# Patient Record
Sex: FEMALE | Race: WHITE | URBAN - METROPOLITAN AREA
[De-identification: names, ages, dates, MRNs, and addresses within clinical notes are randomized per-mention and may not be internally consistent; named-entity substitution may affect disease eponyms.]

---

## 2019-03-27 ENCOUNTER — OFFICE VISIT (OUTPATIENT)
Dept: URBAN - METROPOLITAN AREA CLINIC 16 | Facility: CLINIC | Age: 79
End: 2019-03-27
Payer: MEDICARE

## 2019-03-27 DIAGNOSIS — H25.13 BILATERAL NUCLEAR SCLEROSIS CATARACT: Primary | ICD-10-CM

## 2019-03-27 PROCEDURE — 92004 COMPRE OPH EXAM NEW PT 1/>: CPT | Performed by: OPTOMETRIST

## 2019-03-27 ASSESSMENT — INTRAOCULAR PRESSURE
OD: 14
OS: 15

## 2019-03-27 ASSESSMENT — KERATOMETRY
OS: 45.13
OD: 44.88

## 2019-03-27 NOTE — IMPRESSION/PLAN
Impression: Bilateral nuclear sclerosis cataract: H25.13. Plan: Discussed diagnosis in detail with patient.

## 2019-04-05 ENCOUNTER — OFFICE VISIT (OUTPATIENT)
Dept: URBAN - METROPOLITAN AREA CLINIC 16 | Facility: CLINIC | Age: 79
End: 2019-04-05
Payer: MEDICARE

## 2019-04-05 PROCEDURE — 99203 OFFICE O/P NEW LOW 30 MIN: CPT | Performed by: OPHTHALMOLOGY

## 2019-04-05 PROCEDURE — 99214 OFFICE O/P EST MOD 30 MIN: CPT | Performed by: OPHTHALMOLOGY

## 2019-04-05 RX ORDER — DUREZOL 0.5 MG/ML
0.05 % EMULSION OPHTHALMIC
Qty: 5 | Refills: 0 | Status: INACTIVE
Start: 2019-04-05 | End: 2019-04-18

## 2019-04-05 RX ORDER — BESIFLOXACIN 6 MG/ML
0.6 % SUSPENSION OPHTHALMIC
Qty: 5 | Refills: 0 | Status: INACTIVE
Start: 2019-04-05 | End: 2019-04-13

## 2019-04-05 ASSESSMENT — KERATOMETRY
OS: 45.00
OD: 44.63

## 2019-04-05 ASSESSMENT — INTRAOCULAR PRESSURE
OD: 14
OS: 15

## 2019-04-05 ASSESSMENT — VISUAL ACUITY
OS: 20/40
OD: 20/50

## 2019-04-17 ENCOUNTER — PRE-OPERATIVE VISIT (OUTPATIENT)
Dept: URBAN - METROPOLITAN AREA CLINIC 16 | Facility: CLINIC | Age: 79
End: 2019-04-17
Payer: MEDICARE

## 2019-04-17 DIAGNOSIS — H25.813 COMBINED FORMS OF AGE-RELATED CATARACT, BILATERAL: Primary | ICD-10-CM

## 2019-04-17 PROCEDURE — 92136 OPHTHALMIC BIOMETRY: CPT | Performed by: OPHTHALMOLOGY

## 2019-04-17 ASSESSMENT — PACHYMETRY
OS: 3.01
OD: 23.54
OS: 23.46
OD: 3.02

## 2019-04-18 RX ORDER — OFLOXACIN 3 MG/ML
0.3 % SOLUTION/ DROPS OPHTHALMIC
Qty: 5 | Refills: 0 | Status: INACTIVE
Start: 2019-05-08 | End: 2019-05-10

## 2019-04-18 RX ORDER — PREDNISOLONE ACETATE 10 MG/ML
1 % SUSPENSION/ DROPS OPHTHALMIC
Qty: 10 | Refills: 0 | Status: INACTIVE
Start: 2019-04-18 | End: 2019-05-22

## 2019-04-18 RX ORDER — OFLOXACIN 3 MG/ML
0.3 % SOLUTION/ DROPS OPHTHALMIC
Qty: 5 | Refills: 0 | Status: INACTIVE
Start: 2019-04-18 | End: 2019-05-01

## 2019-04-18 RX ORDER — PREDNISOLONE ACETATE 10 MG/ML
1 % SUSPENSION/ DROPS OPHTHALMIC
Qty: 10 | Refills: 0 | Status: INACTIVE
Start: 2019-05-10 | End: 2019-06-06

## 2019-04-24 ENCOUNTER — SURGERY (OUTPATIENT)
Dept: URBAN - METROPOLITAN AREA SURGERY 11 | Facility: SURGERY | Age: 79
End: 2019-04-24
Payer: MEDICARE

## 2019-04-24 PROCEDURE — 66984 XCAPSL CTRC RMVL W/O ECP: CPT | Performed by: OPHTHALMOLOGY

## 2019-04-25 ENCOUNTER — POST-OPERATIVE VISIT (OUTPATIENT)
Dept: URBAN - METROPOLITAN AREA CLINIC 16 | Facility: CLINIC | Age: 79
End: 2019-04-25

## 2019-04-25 PROCEDURE — 99024 POSTOP FOLLOW-UP VISIT: CPT | Performed by: OPTOMETRIST

## 2019-04-25 ASSESSMENT — INTRAOCULAR PRESSURE
OD: 16
OS: 15

## 2019-05-03 ENCOUNTER — OFFICE VISIT (OUTPATIENT)
Dept: URBAN - METROPOLITAN AREA CLINIC 16 | Facility: CLINIC | Age: 79
End: 2019-05-03
Payer: MEDICARE

## 2019-05-03 DIAGNOSIS — H25.12 AGE-RELATED NUCLEAR CATARACT, LEFT EYE: Primary | ICD-10-CM

## 2019-05-03 PROCEDURE — 99213 OFFICE O/P EST LOW 20 MIN: CPT | Performed by: OPHTHALMOLOGY

## 2019-05-03 ASSESSMENT — INTRAOCULAR PRESSURE
OD: 10
OS: 17

## 2019-05-03 ASSESSMENT — VISUAL ACUITY
OD: 20/20
OS: 20/40

## 2019-05-03 NOTE — IMPRESSION/PLAN
Impression: Age-related nuclear cataract, left eye: H25.12. Sebas Mcguireor Visually significant, quality of life issue, could improve with surgery. Plan: Cataracts account for the patient's complaints. Discussed all risks, benefits, alternatives, procedures and recovery. Patient understands changing glasses will not improve vision. Patient desires to have surgery, recommend phacoemulsification with intraocular lens implant OS.   lvl 2 - pt declines premium IOL

## 2019-05-03 NOTE — IMPRESSION/PLAN
Impression: Presence of intraocular lens: Z96.1. OD. Condition: established, stable. Plan: pt confused on drops, will start them again. d/c abx, taper steroid per plan. d/c shield.

## 2019-05-09 ENCOUNTER — SURGERY (OUTPATIENT)
Dept: URBAN - METROPOLITAN AREA SURGERY 11 | Facility: SURGERY | Age: 79
End: 2019-05-09
Payer: MEDICARE

## 2019-05-09 PROCEDURE — 66984 XCAPSL CTRC RMVL W/O ECP: CPT | Performed by: OPHTHALMOLOGY

## 2019-05-10 ENCOUNTER — POST-OPERATIVE VISIT (OUTPATIENT)
Dept: URBAN - METROPOLITAN AREA CLINIC 23 | Facility: CLINIC | Age: 79
End: 2019-05-10

## 2019-05-10 DIAGNOSIS — Z09 ENCNTR FOR F/U EXAM AFT TRTMT FOR COND OTH THAN MALIG NEOPLM: Primary | ICD-10-CM

## 2019-05-10 PROCEDURE — 99024 POSTOP FOLLOW-UP VISIT: CPT | Performed by: OPTOMETRIST

## 2019-05-10 RX ORDER — OFLOXACIN 3 MG/ML
0.3 % SOLUTION/ DROPS OPHTHALMIC
Qty: 5 | Refills: 0 | Status: INACTIVE
Start: 2019-05-10 | End: 2019-06-04

## 2019-05-10 ASSESSMENT — INTRAOCULAR PRESSURE
OD: 11
OS: 10

## 2019-05-14 ENCOUNTER — POST-OPERATIVE VISIT (OUTPATIENT)
Dept: URBAN - METROPOLITAN AREA CLINIC 16 | Facility: CLINIC | Age: 79
End: 2019-05-14

## 2019-05-14 PROCEDURE — 99024 POSTOP FOLLOW-UP VISIT: CPT | Performed by: OPTOMETRIST

## 2019-05-14 ASSESSMENT — INTRAOCULAR PRESSURE
OS: 11
OD: 10

## 2019-06-04 ENCOUNTER — POST-OPERATIVE VISIT (OUTPATIENT)
Dept: URBAN - METROPOLITAN AREA CLINIC 16 | Facility: CLINIC | Age: 79
End: 2019-06-04

## 2019-06-04 PROCEDURE — 99024 POSTOP FOLLOW-UP VISIT: CPT | Performed by: OPTOMETRIST

## 2019-06-04 ASSESSMENT — INTRAOCULAR PRESSURE
OS: 11
OD: 11

## 2019-09-05 ENCOUNTER — OFFICE VISIT (OUTPATIENT)
Dept: URBAN - METROPOLITAN AREA CLINIC 16 | Facility: CLINIC | Age: 79
End: 2019-09-05
Payer: MEDICARE

## 2019-09-05 DIAGNOSIS — Z96.1 PRESENCE OF PSEUDOPHAKIA: Primary | ICD-10-CM

## 2019-09-05 DIAGNOSIS — H04.123 DRY EYE SYNDROME OF BILATERAL LACRIMAL GLANDS: ICD-10-CM

## 2019-09-05 PROCEDURE — 92014 COMPRE OPH EXAM EST PT 1/>: CPT | Performed by: OPTOMETRIST

## 2019-09-05 ASSESSMENT — INTRAOCULAR PRESSURE
OD: 12
OS: 10

## 2020-09-17 ENCOUNTER — OFFICE VISIT (OUTPATIENT)
Dept: URBAN - METROPOLITAN AREA CLINIC 16 | Facility: CLINIC | Age: 80
End: 2020-09-17
Payer: MEDICARE

## 2020-09-17 DIAGNOSIS — H10.45 OTHER CHRONIC ALLERGIC CONJUNCTIVITIS: ICD-10-CM

## 2020-09-17 PROCEDURE — 92014 COMPRE OPH EXAM EST PT 1/>: CPT | Performed by: OPTOMETRIST

## 2020-09-17 ASSESSMENT — INTRAOCULAR PRESSURE
OD: 14
OS: 14

## 2020-09-17 NOTE — IMPRESSION/PLAN
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 Plan: Patient is following with retinal specialist (Dr. Lety Carter) q6 months. Currently just observing.

## 2021-09-16 ENCOUNTER — OFFICE VISIT (OUTPATIENT)
Dept: URBAN - METROPOLITAN AREA CLINIC 16 | Facility: CLINIC | Age: 81
End: 2021-09-16
Payer: MEDICARE

## 2021-09-16 DIAGNOSIS — D31.30 BENIGN NEOPLASM OF CHOROID: ICD-10-CM

## 2021-09-16 PROCEDURE — 99213 OFFICE O/P EST LOW 20 MIN: CPT | Performed by: OPTOMETRIST

## 2021-09-16 ASSESSMENT — KERATOMETRY
OD: 44.75
OS: 45.25

## 2021-09-16 ASSESSMENT — INTRAOCULAR PRESSURE
OD: 14
OS: 14

## 2021-09-16 NOTE — IMPRESSION/PLAN
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 Plan:  Will continue to follow with Dr. Mynor Hebert

## 2022-09-12 ENCOUNTER — OFFICE VISIT (OUTPATIENT)
Dept: URBAN - METROPOLITAN AREA CLINIC 16 | Facility: CLINIC | Age: 82
End: 2022-09-12
Payer: MEDICARE

## 2022-09-12 DIAGNOSIS — D31.30 BENIGN NEOPLASM OF CHOROID: ICD-10-CM

## 2022-09-12 DIAGNOSIS — Z96.1 PRESENCE OF PSEUDOPHAKIA: Primary | ICD-10-CM

## 2022-09-12 PROCEDURE — 99213 OFFICE O/P EST LOW 20 MIN: CPT | Performed by: OPTOMETRIST

## 2022-09-12 ASSESSMENT — INTRAOCULAR PRESSURE
OS: 16
OD: 16

## 2023-09-25 ENCOUNTER — OFFICE VISIT (OUTPATIENT)
Dept: URBAN - METROPOLITAN AREA CLINIC 16 | Facility: CLINIC | Age: 83
End: 2023-09-25
Payer: MEDICARE

## 2023-09-25 DIAGNOSIS — Z96.1 PRESENCE OF PSEUDOPHAKIA: Primary | ICD-10-CM

## 2023-09-25 DIAGNOSIS — D31.30 BENIGN NEOPLASM OF CHOROID: ICD-10-CM

## 2023-09-25 PROCEDURE — 99213 OFFICE O/P EST LOW 20 MIN: CPT | Performed by: OPTOMETRIST

## 2023-09-25 ASSESSMENT — INTRAOCULAR PRESSURE
OD: 14
OS: 13

## 2024-09-24 ENCOUNTER — OFFICE VISIT (OUTPATIENT)
Dept: URBAN - METROPOLITAN AREA CLINIC 16 | Facility: CLINIC | Age: 84
End: 2024-09-24
Payer: MEDICARE

## 2024-09-24 DIAGNOSIS — Z96.1 PRESENCE OF PSEUDOPHAKIA: Primary | ICD-10-CM

## 2024-09-24 DIAGNOSIS — H04.123 DRY EYE SYNDROME OF BILATERAL LACRIMAL GLANDS: ICD-10-CM

## 2024-09-24 DIAGNOSIS — D31.30 BENIGN NEOPLASM OF CHOROID: ICD-10-CM

## 2024-09-24 PROCEDURE — 99213 OFFICE O/P EST LOW 20 MIN: CPT | Performed by: OPTOMETRIST

## 2024-09-24 ASSESSMENT — INTRAOCULAR PRESSURE
OD: 18
OS: 18

## 2025-02-11 ENCOUNTER — OFFICE VISIT (OUTPATIENT)
Dept: URBAN - METROPOLITAN AREA CLINIC 16 | Facility: CLINIC | Age: 85
End: 2025-02-11
Payer: MEDICARE

## 2025-02-11 DIAGNOSIS — H11.31 SUBCONJUNCTIVAL HEMORRHAGE OF RIGHT EYE: Primary | ICD-10-CM

## 2025-02-11 PROCEDURE — 99212 OFFICE O/P EST SF 10 MIN: CPT

## 2025-02-11 ASSESSMENT — INTRAOCULAR PRESSURE
OS: 16
OD: 16